# Patient Record
Sex: FEMALE | Race: WHITE | Employment: UNEMPLOYED | URBAN - NONMETROPOLITAN AREA
[De-identification: names, ages, dates, MRNs, and addresses within clinical notes are randomized per-mention and may not be internally consistent; named-entity substitution may affect disease eponyms.]

---

## 2024-10-21 RX ORDER — SODIUM CHLORIDE 0.9 % (FLUSH) 0.9 %
5-40 SYRINGE (ML) INJECTION EVERY 12 HOURS SCHEDULED
Status: DISCONTINUED | OUTPATIENT
Start: 2024-10-21 | End: 2024-10-23 | Stop reason: HOSPADM

## 2024-10-21 RX ORDER — SODIUM CHLORIDE 0.9 % (FLUSH) 0.9 %
5-40 SYRINGE (ML) INJECTION PRN
Status: DISCONTINUED | OUTPATIENT
Start: 2024-10-21 | End: 2024-10-23 | Stop reason: HOSPADM

## 2024-10-21 RX ORDER — SODIUM CHLORIDE 9 MG/ML
INJECTION, SOLUTION INTRAVENOUS CONTINUOUS
Status: DISCONTINUED | OUTPATIENT
Start: 2024-10-21 | End: 2024-10-23 | Stop reason: HOSPADM

## 2024-10-21 RX ORDER — SODIUM CHLORIDE 9 MG/ML
25 INJECTION, SOLUTION INTRAVENOUS PRN
Status: DISCONTINUED | OUTPATIENT
Start: 2024-10-21 | End: 2024-10-23 | Stop reason: HOSPADM

## 2024-10-23 ENCOUNTER — HOSPITAL ENCOUNTER (OUTPATIENT)
Age: 59
Setting detail: OUTPATIENT SURGERY
Discharge: HOME | End: 2024-10-23
Attending: INTERNAL MEDICINE | Admitting: INTERNAL MEDICINE
Payer: MEDICARE

## 2024-10-23 ENCOUNTER — ANESTHESIA EVENT (OUTPATIENT)
Dept: ENDOSCOPY | Age: 59
End: 2024-10-23
Payer: MEDICARE

## 2024-10-23 ENCOUNTER — ANESTHESIA (OUTPATIENT)
Dept: ENDOSCOPY | Age: 59
End: 2024-10-23
Payer: MEDICARE

## 2024-10-23 ENCOUNTER — APPOINTMENT (OUTPATIENT)
Dept: ENDOSCOPY | Age: 59
End: 2024-10-23
Attending: INTERNAL MEDICINE
Payer: MEDICARE

## 2024-10-23 VITALS
HEART RATE: 70 BPM | WEIGHT: 169.8 LBS | OXYGEN SATURATION: 95 % | SYSTOLIC BLOOD PRESSURE: 93 MMHG | RESPIRATION RATE: 16 BRPM | BODY MASS INDEX: 33.34 KG/M2 | HEIGHT: 60 IN | DIASTOLIC BLOOD PRESSURE: 58 MMHG | TEMPERATURE: 97 F

## 2024-10-23 PROCEDURE — 6360000002 HC RX W HCPCS: Performed by: NURSE ANESTHETIST, CERTIFIED REGISTERED

## 2024-10-23 PROCEDURE — 7100000010 HC PHASE II RECOVERY - FIRST 15 MIN: Performed by: INTERNAL MEDICINE

## 2024-10-23 PROCEDURE — 2709999900 HC NON-CHARGEABLE SUPPLY: Performed by: INTERNAL MEDICINE

## 2024-10-23 PROCEDURE — 2580000003 HC RX 258: Performed by: INTERNAL MEDICINE

## 2024-10-23 PROCEDURE — 88305 TISSUE EXAM BY PATHOLOGIST: CPT

## 2024-10-23 PROCEDURE — 7100000011 HC PHASE II RECOVERY - ADDTL 15 MIN: Performed by: INTERNAL MEDICINE

## 2024-10-23 PROCEDURE — 2720000010 HC SURG SUPPLY STERILE: Performed by: INTERNAL MEDICINE

## 2024-10-23 PROCEDURE — 2500000003 HC RX 250 WO HCPCS: Performed by: NURSE ANESTHETIST, CERTIFIED REGISTERED

## 2024-10-23 PROCEDURE — 3609010600 HC COLONOSCOPY POLYPECTOMY SNARE/COLD BIOPSY: Performed by: INTERNAL MEDICINE

## 2024-10-23 PROCEDURE — 3700000000 HC ANESTHESIA ATTENDED CARE: Performed by: INTERNAL MEDICINE

## 2024-10-23 PROCEDURE — 3700000001 HC ADD 15 MINUTES (ANESTHESIA): Performed by: INTERNAL MEDICINE

## 2024-10-23 RX ORDER — MONTELUKAST SODIUM 10 MG/1
10 TABLET ORAL NIGHTLY
COMMUNITY

## 2024-10-23 RX ORDER — LEVOTHYROXINE SODIUM 25 UG/1
25 TABLET ORAL DAILY
COMMUNITY

## 2024-10-23 RX ORDER — SPIRONOLACTONE 25 MG/1
25 TABLET ORAL DAILY
COMMUNITY

## 2024-10-23 RX ORDER — LIDOCAINE HYDROCHLORIDE 20 MG/ML
INJECTION, SOLUTION EPIDURAL; INFILTRATION; INTRACAUDAL; PERINEURAL
Status: DISCONTINUED | OUTPATIENT
Start: 2024-10-23 | End: 2024-10-23 | Stop reason: SDUPTHER

## 2024-10-23 RX ORDER — ASPIRIN 81 MG/1
81 TABLET ORAL DAILY
COMMUNITY

## 2024-10-23 RX ORDER — METOPROLOL TARTRATE 100 MG/1
100 TABLET ORAL 2 TIMES DAILY
COMMUNITY

## 2024-10-23 RX ORDER — BUPROPION HYDROCHLORIDE 100 MG/1
100 TABLET ORAL 2 TIMES DAILY
COMMUNITY

## 2024-10-23 RX ORDER — PROPOFOL 10 MG/ML
INJECTION, EMULSION INTRAVENOUS
Status: DISCONTINUED | OUTPATIENT
Start: 2024-10-23 | End: 2024-10-23 | Stop reason: SDUPTHER

## 2024-10-23 RX ORDER — PRAMIPEXOLE DIHYDROCHLORIDE 0.5 MG/1
0.5 TABLET ORAL 2 TIMES DAILY
COMMUNITY

## 2024-10-23 RX ORDER — ISOSORBIDE MONONITRATE 30 MG/1
30 TABLET, EXTENDED RELEASE ORAL DAILY
COMMUNITY

## 2024-10-23 RX ORDER — DULOXETIN HYDROCHLORIDE 60 MG/1
60 CAPSULE, DELAYED RELEASE ORAL DAILY
COMMUNITY

## 2024-10-23 RX ADMIN — PROPOFOL 25 MG: 10 INJECTION, EMULSION INTRAVENOUS at 13:43

## 2024-10-23 RX ADMIN — PROPOFOL 25 MG: 10 INJECTION, EMULSION INTRAVENOUS at 13:51

## 2024-10-23 RX ADMIN — LIDOCAINE HYDROCHLORIDE 75 MG: 20 INJECTION, SOLUTION EPIDURAL; INFILTRATION; INTRACAUDAL; PERINEURAL at 13:30

## 2024-10-23 RX ADMIN — PROPOFOL 25 MG: 10 INJECTION, EMULSION INTRAVENOUS at 13:32

## 2024-10-23 RX ADMIN — PROPOFOL 50 MG: 10 INJECTION, EMULSION INTRAVENOUS at 13:59

## 2024-10-23 RX ADMIN — PROPOFOL 50 MG: 10 INJECTION, EMULSION INTRAVENOUS at 13:45

## 2024-10-23 RX ADMIN — PROPOFOL 50 MG: 10 INJECTION, EMULSION INTRAVENOUS at 13:35

## 2024-10-23 RX ADMIN — PROPOFOL 25 MG: 10 INJECTION, EMULSION INTRAVENOUS at 13:57

## 2024-10-23 RX ADMIN — PROPOFOL 25 MG: 10 INJECTION, EMULSION INTRAVENOUS at 14:01

## 2024-10-23 RX ADMIN — PROPOFOL 50 MG: 10 INJECTION, EMULSION INTRAVENOUS at 13:47

## 2024-10-23 RX ADMIN — PROPOFOL 50 MG: 10 INJECTION, EMULSION INTRAVENOUS at 13:53

## 2024-10-23 RX ADMIN — PROPOFOL 25 MG: 10 INJECTION, EMULSION INTRAVENOUS at 13:38

## 2024-10-23 RX ADMIN — PROPOFOL 25 MG: 10 INJECTION, EMULSION INTRAVENOUS at 13:55

## 2024-10-23 RX ADMIN — SODIUM CHLORIDE: 9 INJECTION, SOLUTION INTRAVENOUS at 12:45

## 2024-10-23 RX ADMIN — PROPOFOL 25 MG: 10 INJECTION, EMULSION INTRAVENOUS at 13:49

## 2024-10-23 RX ADMIN — PROPOFOL 25 MG: 10 INJECTION, EMULSION INTRAVENOUS at 13:41

## 2024-10-23 ASSESSMENT — PAIN - FUNCTIONAL ASSESSMENT
PAIN_FUNCTIONAL_ASSESSMENT: NONE - DENIES PAIN

## 2024-10-23 NOTE — DISCHARGE INSTRUCTIONS
- Await pathology.  - Continue current medications.  - Repeat colonoscopy in 5 years depending upon pathology results  - Follow up with primary care physician as scheduled.  - Follow up with myself for [facility] banding if patient agreeable.

## 2024-10-23 NOTE — ANESTHESIA PRE PROCEDURE
Applicable): No results found for: \"PREGTESTUR\", \"PREGSERUM\", \"HCG\", \"HCGQUANT\"     ABGs: No results found for: \"PHART\", \"PO2ART\", \"OSH0RHD\", \"PWG5OLG\", \"BEART\", \"C3MFXYQH\"     Type & Screen (If Applicable):  No results found for: \"ABORH\", \"LABANTI\"    Drug/Infectious Status (If Applicable):  No results found for: \"HIV\", \"HEPCAB\"    COVID-19 Screening (If Applicable): No results found for: \"COVID19\"        Anesthesia Evaluation  Patient summary reviewed and Nursing notes reviewed  Airway: Mallampati: II  TM distance: <3 FB   Neck ROM: full  Mouth opening: < 3 FB   Dental: normal exam         Pulmonary:   (+)     sleep apnea: on noncompliant,   decreased breath sounds: bilateral              Patient did not smoke on day of surgery.                 Cardiovascular:  Exercise tolerance: good (>4 METS)  (+) CHF: no interval change        Rhythm: regular  Rate: normal                    Neuro/Psych:   Negative Neuro/Psych ROS              GI/Hepatic/Renal:   (+) GERD: no interval change, bowel prep, morbid obesity          Endo/Other:    (+) hypothyroidism::..                 Abdominal:   (+) obese    Abdomen: soft.      Vascular: negative vascular ROS.         Other Findings:             Anesthesia Plan      MAC     ASA 3       Induction: intravenous.      Anesthetic plan and risks discussed with patient.    Use of blood products discussed with patient whom.                      Naomi Hardy, JAYDE - CRNA   10/23/2024

## 2024-10-23 NOTE — OP NOTE
[facility]  Colonoscopy Procedure Note      Patient: Mary Muñiz  : 1965      Procedure: Colonoscopy with polypectomy (cold snare)    Date:  10/23/2024     Endoscopist:   Gladys Jimenez MD    Referring Physician: Gladys Jimenez MD  Primary Care Physician: No primary care provider on file.    Indications: This is a 59 y.o. year old female who presents with perianal pressure.     Anesthesia: MAC per Anesthesia. Please see anesthesia report.    Consent:  The patient or their legal guardian has signed a consent and is aware of the potential risks, benefits, alternatives, and potential complications of this procedure.  These include, but are not limited to hemorrhage, bleeding, post procedural pain, perforation, phlebitis, aspiration, hypotension, hypoxia, cardiovascular events such as arrhythmia, and possibly death.  Additionally, the possibility of missed colonic polyps and interval colon cancer was discussed in the consent.    Description of Procedure: The patient was then taken to the endoscopy suite and placed in the left lateral decubitus position and the above IV sedation was administered.    The perianal area was inspected and a digital rectal examination was performed.    A forward-viewing Olympus video adult colonoscope was lubricated and inserted through the patient's anus into the rectum. Under direct visualization, the scope was advanced to the cecum. The cecum was identified by the appendiceal orifice and ileocecal valve. [place] pictures were obtained.      The prep was good.     The scope was then slowly withdrawn and circumferential examination of the mucosa was performed.  The scope was then withdrawn into the rectum and retroflexed.  A retroflexed view of the anal verge and rectum was obtained. The scope was straightened, the colon was decompressed and the scope was withdrawn from the patient.  The patient tolerated the procedure well and was taken to the recovery area in good condition.

## 2024-10-23 NOTE — PROGRESS NOTES
Colonoscopy completed, pictures taken. 1 polyp removed in transverse colon. Retrieved and labeled for lab.  Pt tolerated well and transferred to Recovery.     Scope  Used.

## 2024-10-23 NOTE — H&P
Merged with [facility]   Pre-Operative History and Physical: Colonoscopy    Patient: Mary Muñiz  : 1965      History Obtained From:  patient, electronic medical record    HISTORY OF PRESENT ILLNESS:    The patient is a 59 y.o. female who present for colonoscopy with perianal pain.    Past Medical History:    No past medical history on file.  Past Surgical History:    No past surgical history on file.  Medications Prior to Admission:   No current facility-administered medications on file prior to encounter.     No current outpatient medications on file prior to encounter.        Allergies:  Latex, Gadolinium derivatives, Penicillins, and Statins    Social History:   TOBACCO:   has no history on file for tobacco use.  ETOH:   has no history on file for alcohol use.  DRUGS:   has no history on file for drug use.  Family History:   No family history on file.    PHYSICAL EXAM:    /64   Pulse 78   Temp 97.5 °F (36.4 °C) (Temporal)   Resp 18   Ht 1.524 m (5')   Wt 77 kg (169 lb 12.8 oz)   SpO2 100%   BMI 33.16 kg/m²       Heart:  Regular rate and rhythm  Lungs:  No increased work of breathing  Abdomen:  BS+, soft, non-distended, non-tender, no masses palpated    ASA Grade:  See Anesthesia documentation    Mallampati Classification:  See Anesthesia documentation    ASSESSMENT AND PLAN:    1.  Patient is a 59 y.o. female here for a colonoscopy with MAC.    2.  Procedure options, risks and benefits reviewed with patient.  We specifically discussed that risks include but are not limited to infection, bleeding, perforation, death, and missed lesions.  Patient expresses understanding.    Electronically signed by Gladys Jimenez MD on 10/23/2024 at 12:36 PM    Gladys Jimenez MD  Merged with [facility]

## 2024-10-23 NOTE — ANESTHESIA POSTPROCEDURE EVALUATION
Department of Anesthesiology  Postprocedure Note    Patient: Mary Muñiz  MRN: [id number]  YOB: 1965  Date of evaluation: 10/23/2024    Procedure Summary       Date: 10/23/24 Room / Location: [place] [address] / [facility]    Anesthesia Start: 1329 Anesthesia Stop: 1405    Procedure: COLONOSCOPY POLYPECTOMY SNARE/BIOPSY Diagnosis:       Acute hemorrhoid      (Acute hemorrhoid [K64.9])    Surgeons: Gladys Jimenez MD Responsible Provider: Armando Zelaya DO    Anesthesia Type: MAC ASA Status: 3            Anesthesia Type: No value filed.    Terese Phase I: Terese Score: 10    Terese Phase II:      Anesthesia Post Evaluation    Patient location during evaluation: bedside  Patient participation: complete - patient participated  Level of consciousness: awake and alert  Pain score: 0  Airway patency: patent  Nausea & Vomiting: no nausea and no vomiting  Cardiovascular status: blood pressure returned to baseline and hemodynamically stable  Respiratory status: acceptable, nonlabored ventilation and room air  Hydration status: stable  Pain management: satisfactory to patient        No notable events documented.

## 2024-10-23 NOTE — PROGRESS NOTES
Recovery mode. Patient denies discomfort. Passing gas, taking fluids. Dr. Jimenez discussed findings with patient and friend. Discharge instructions provided and understanding verbalized.

## (undated) DEVICE — GLOVE ORTHO 8   MSG9480

## (undated) DEVICE — BIOGUARD A/W CLEANING ADAPTER